# Patient Record
Sex: FEMALE | Race: OTHER | NOT HISPANIC OR LATINO
[De-identification: names, ages, dates, MRNs, and addresses within clinical notes are randomized per-mention and may not be internally consistent; named-entity substitution may affect disease eponyms.]

---

## 2017-12-28 ENCOUNTER — APPOINTMENT (OUTPATIENT)
Dept: CARDIOLOGY | Facility: CLINIC | Age: 62
End: 2017-12-28
Payer: COMMERCIAL

## 2017-12-28 PROCEDURE — 93351 STRESS TTE COMPLETE: CPT

## 2018-01-04 ENCOUNTER — APPOINTMENT (OUTPATIENT)
Dept: CARDIOLOGY | Facility: CLINIC | Age: 63
End: 2018-01-04

## 2018-01-11 ENCOUNTER — APPOINTMENT (OUTPATIENT)
Dept: CARDIOLOGY | Facility: CLINIC | Age: 63
End: 2018-01-11
Payer: COMMERCIAL

## 2018-01-11 PROCEDURE — 99214 OFFICE O/P EST MOD 30 MIN: CPT

## 2018-01-25 ENCOUNTER — RECORD ABSTRACTING (OUTPATIENT)
Age: 63
End: 2018-01-25

## 2018-01-25 DIAGNOSIS — Z78.9 OTHER SPECIFIED HEALTH STATUS: ICD-10-CM

## 2018-02-10 ENCOUNTER — APPOINTMENT (OUTPATIENT)
Dept: MAMMOGRAPHY | Facility: CLINIC | Age: 63
End: 2018-02-10
Payer: COMMERCIAL

## 2018-02-10 ENCOUNTER — OUTPATIENT (OUTPATIENT)
Dept: OUTPATIENT SERVICES | Facility: HOSPITAL | Age: 63
LOS: 1 days | End: 2018-02-10
Payer: COMMERCIAL

## 2018-02-10 DIAGNOSIS — Z98.89 OTHER SPECIFIED POSTPROCEDURAL STATES: Chronic | ICD-10-CM

## 2018-02-10 DIAGNOSIS — Z00.00 ENCOUNTER FOR GENERAL ADULT MEDICAL EXAMINATION WITHOUT ABNORMAL FINDINGS: ICD-10-CM

## 2018-02-10 PROCEDURE — 77063 BREAST TOMOSYNTHESIS BI: CPT | Mod: 26

## 2018-02-10 PROCEDURE — 77067 SCR MAMMO BI INCL CAD: CPT

## 2018-02-10 PROCEDURE — 77067 SCR MAMMO BI INCL CAD: CPT | Mod: 26

## 2018-02-10 PROCEDURE — 77063 BREAST TOMOSYNTHESIS BI: CPT

## 2018-02-28 ENCOUNTER — RX RENEWAL (OUTPATIENT)
Age: 63
End: 2018-02-28

## 2018-03-01 ENCOUNTER — TRANSCRIPTION ENCOUNTER (OUTPATIENT)
Age: 63
End: 2018-03-01

## 2018-03-01 ENCOUNTER — APPOINTMENT (OUTPATIENT)
Dept: CARDIOLOGY | Facility: CLINIC | Age: 63
End: 2018-03-01
Payer: COMMERCIAL

## 2018-03-01 VITALS
HEIGHT: 60 IN | DIASTOLIC BLOOD PRESSURE: 80 MMHG | HEART RATE: 67 BPM | WEIGHT: 149 LBS | RESPIRATION RATE: 14 BRPM | SYSTOLIC BLOOD PRESSURE: 148 MMHG | BODY MASS INDEX: 29.25 KG/M2

## 2018-03-01 PROCEDURE — 93000 ELECTROCARDIOGRAM COMPLETE: CPT

## 2018-03-01 PROCEDURE — 99214 OFFICE O/P EST MOD 30 MIN: CPT

## 2018-05-16 ENCOUNTER — RECORD ABSTRACTING (OUTPATIENT)
Age: 63
End: 2018-05-16

## 2018-05-17 ENCOUNTER — APPOINTMENT (OUTPATIENT)
Dept: CARDIOLOGY | Facility: CLINIC | Age: 63
End: 2018-05-17
Payer: COMMERCIAL

## 2018-05-17 VITALS
BODY MASS INDEX: 29.45 KG/M2 | DIASTOLIC BLOOD PRESSURE: 74 MMHG | SYSTOLIC BLOOD PRESSURE: 126 MMHG | WEIGHT: 150 LBS | HEIGHT: 60 IN | HEART RATE: 69 BPM | OXYGEN SATURATION: 99 %

## 2018-05-17 DIAGNOSIS — M10.9 GOUT, UNSPECIFIED: ICD-10-CM

## 2018-05-17 PROCEDURE — 93000 ELECTROCARDIOGRAM COMPLETE: CPT

## 2018-05-17 PROCEDURE — 99214 OFFICE O/P EST MOD 30 MIN: CPT

## 2018-05-17 RX ORDER — FEBUXOSTAT 40 MG/1
40 TABLET ORAL
Qty: 30 | Refills: 0 | Status: DISCONTINUED | COMMUNITY
Start: 2018-03-26 | End: 2018-05-17

## 2018-06-04 ENCOUNTER — RX RENEWAL (OUTPATIENT)
Age: 63
End: 2018-06-04

## 2018-08-20 ENCOUNTER — APPOINTMENT (OUTPATIENT)
Dept: CARDIOLOGY | Facility: CLINIC | Age: 63
End: 2018-08-20
Payer: COMMERCIAL

## 2018-11-01 ENCOUNTER — APPOINTMENT (OUTPATIENT)
Dept: CARDIOLOGY | Facility: CLINIC | Age: 63
End: 2018-11-01
Payer: COMMERCIAL

## 2018-11-01 PROCEDURE — A9500: CPT

## 2018-11-01 PROCEDURE — 93015 CV STRESS TEST SUPVJ I&R: CPT

## 2018-11-01 PROCEDURE — 78452 HT MUSCLE IMAGE SPECT MULT: CPT

## 2018-11-01 RX ORDER — AMINOPHYLLINE 25 MG/ML
25 INJECTION, SOLUTION INTRAVENOUS
Qty: 0 | Refills: 0 | Status: COMPLETED | OUTPATIENT
Start: 2018-11-01

## 2018-11-01 RX ORDER — REGADENOSON 0.08 MG/ML
0.4 INJECTION, SOLUTION INTRAVENOUS
Qty: 1 | Refills: 0 | Status: COMPLETED | OUTPATIENT
Start: 2018-11-01

## 2018-11-01 RX ADMIN — REGADENOSON 0 MG/5ML: 0.08 INJECTION, SOLUTION INTRAVENOUS at 00:00

## 2018-11-01 RX ADMIN — AMINOPHYLLINE 0 MG/ML: 25 INJECTION, SOLUTION INTRAVENOUS at 00:00

## 2018-11-08 RX ORDER — KIT FOR THE PREPARATION OF TECHNETIUM TC99M SESTAMIBI 1 MG/5ML
INJECTION, POWDER, LYOPHILIZED, FOR SOLUTION PARENTERAL
Refills: 0 | Status: COMPLETED | OUTPATIENT
Start: 2018-11-08

## 2018-11-08 RX ADMIN — KIT FOR THE PREPARATION OF TECHNETIUM TC99M SESTAMIBI 0: 1 INJECTION, POWDER, LYOPHILIZED, FOR SOLUTION PARENTERAL at 00:00

## 2018-11-26 ENCOUNTER — APPOINTMENT (OUTPATIENT)
Dept: CARDIOLOGY | Facility: CLINIC | Age: 63
End: 2018-11-26
Payer: COMMERCIAL

## 2018-11-26 VITALS
BODY MASS INDEX: 29.45 KG/M2 | RESPIRATION RATE: 14 BRPM | OXYGEN SATURATION: 98 % | WEIGHT: 150 LBS | DIASTOLIC BLOOD PRESSURE: 72 MMHG | HEIGHT: 60 IN | SYSTOLIC BLOOD PRESSURE: 116 MMHG | HEART RATE: 94 BPM

## 2018-11-26 PROCEDURE — 93000 ELECTROCARDIOGRAM COMPLETE: CPT

## 2018-11-26 PROCEDURE — 99213 OFFICE O/P EST LOW 20 MIN: CPT

## 2018-11-26 RX ORDER — FEBUXOSTAT 80 MG/1
80 TABLET ORAL
Qty: 90 | Refills: 0 | Status: DISCONTINUED | COMMUNITY
Start: 2018-04-29 | End: 2018-11-26

## 2018-11-26 RX ORDER — ALLOPURINOL 200 MG/1
TABLET ORAL
Refills: 0 | Status: DISCONTINUED | COMMUNITY
End: 2018-11-26

## 2018-11-26 NOTE — DISCUSSION/SUMMARY
[FreeTextEntry1] : 1. No additional cardiac testing at this time.\par 2. Continue current cardiac meds in doses as noted above for hypertension.\par 3. Monitor BP at home, keep a log and bring to f/u.\par 4. Consider rheumatology evaluation for her gout.\par 5. Continue physical therapy for her neck.\par 6. Will obtain most recent bloodwork.\par 7. Follow up with primary doctor for treatment of diabetes.\par 8. Follow up here in six months.

## 2018-11-26 NOTE — ASSESSMENT
[FreeTextEntry1] : EKG: Sinus rhythm with nonspecific T-wave changes.\par \par Her stress test performed November 1, 2018 is a pharmacologic study which was tolerated well. Blood pressure response was normal. EKG showed horizontal ST depressions with infusion. However at one point and the nuclear images which demonstrate no evidence of ischemia or infarct with a normal ejection fraction of 74%. Incidentally noted was borderline TID of no clear significance given the normal perfusion.\par \par 63-year-old female with a past medical history of hypertension who presents today for followup. Prior stress echo was unremarkable so she underwent a nuclear stress test which reveals no significant evidence of ischemia or infarct I believe her EKGs are simply false positive findings. She remains asymptomatic from a cardiac standpoint. Blood pressure appears to be well-controlled. I do not believe the finding of TID is significant at this time.

## 2018-11-26 NOTE — REVIEW OF SYSTEMS
[FreeTextEntry1] : Patient denies chest pain, other than noted above full review of systems is unremarkable.

## 2018-11-26 NOTE — HISTORY OF PRESENT ILLNESS
[FreeTextEntry1] : Patient comes back to the office today still having issues with gout in her hands and also pain in her neck. The pain in her neck is still improving with physical therapy but nothing seems to be helping with the gout. Her blood pressures have been in the 120s systolic as reported by the patient at home. She reports no other symptoms at this time and has had no chest pain. Patient denies shortness of breath, palpitations, orthopnea, presyncope, syncope.

## 2018-11-26 NOTE — PHYSICAL EXAM
[General Appearance - In No Acute Distress] : no acute distress [Normal Conjunctiva] : the conjunctiva exhibited no abnormalities [Normal Oral Mucosa] : normal oral mucosa [Normal Oropharynx] : normal oropharynx [Auscultation Breath Sounds / Voice Sounds] : lungs were clear to auscultation bilaterally [Abdomen Soft] : soft [Abdomen Tenderness] : non-tender [Abnormal Walk] : normal gait [Nail Clubbing] : no clubbing of the fingernails [Cyanosis, Localized] : no localized cyanosis [Skin Color & Pigmentation] : normal skin color and pigmentation [Oriented To Time, Place, And Person] : oriented to person, place, and time [Affect] : the affect was normal [FreeTextEntry1] : Cardiac: Normal S1 and split S2, no S3, no S4. No audible murmurs or rubs. Regular rate and rhythm.

## 2018-12-28 ENCOUNTER — APPOINTMENT (OUTPATIENT)
Dept: CARDIOLOGY | Facility: CLINIC | Age: 63
End: 2018-12-28

## 2019-02-16 ENCOUNTER — APPOINTMENT (OUTPATIENT)
Dept: MAMMOGRAPHY | Facility: CLINIC | Age: 64
End: 2019-02-16
Payer: COMMERCIAL

## 2019-02-16 ENCOUNTER — OUTPATIENT (OUTPATIENT)
Dept: OUTPATIENT SERVICES | Facility: HOSPITAL | Age: 64
LOS: 1 days | End: 2019-02-16
Payer: COMMERCIAL

## 2019-02-16 DIAGNOSIS — Z12.31 ENCOUNTER FOR SCREENING MAMMOGRAM FOR MALIGNANT NEOPLASM OF BREAST: ICD-10-CM

## 2019-02-16 DIAGNOSIS — Z98.89 OTHER SPECIFIED POSTPROCEDURAL STATES: Chronic | ICD-10-CM

## 2019-02-16 PROCEDURE — 77063 BREAST TOMOSYNTHESIS BI: CPT | Mod: 26

## 2019-02-16 PROCEDURE — 77063 BREAST TOMOSYNTHESIS BI: CPT

## 2019-02-16 PROCEDURE — 77067 SCR MAMMO BI INCL CAD: CPT

## 2019-02-16 PROCEDURE — 77067 SCR MAMMO BI INCL CAD: CPT | Mod: 26

## 2019-06-05 ENCOUNTER — MEDICATION RENEWAL (OUTPATIENT)
Age: 64
End: 2019-06-05

## 2019-06-06 ENCOUNTER — APPOINTMENT (OUTPATIENT)
Dept: CARDIOLOGY | Facility: CLINIC | Age: 64
End: 2019-06-06
Payer: COMMERCIAL

## 2019-06-06 ENCOUNTER — NON-APPOINTMENT (OUTPATIENT)
Age: 64
End: 2019-06-06

## 2019-06-06 VITALS
HEART RATE: 76 BPM | HEIGHT: 60 IN | DIASTOLIC BLOOD PRESSURE: 73 MMHG | WEIGHT: 149 LBS | RESPIRATION RATE: 14 BRPM | SYSTOLIC BLOOD PRESSURE: 122 MMHG | OXYGEN SATURATION: 99 % | BODY MASS INDEX: 29.25 KG/M2

## 2019-06-06 DIAGNOSIS — R73.03 PREDIABETES.: ICD-10-CM

## 2019-06-06 DIAGNOSIS — R94.31 ABNORMAL ELECTROCARDIOGRAM [ECG] [EKG]: ICD-10-CM

## 2019-06-06 PROCEDURE — 99214 OFFICE O/P EST MOD 30 MIN: CPT

## 2019-06-06 PROCEDURE — 93000 ELECTROCARDIOGRAM COMPLETE: CPT

## 2019-06-06 NOTE — PHYSICAL EXAM
[General Appearance - In No Acute Distress] : no acute distress [Normal Conjunctiva] : the conjunctiva exhibited no abnormalities [Normal Oral Mucosa] : normal oral mucosa [Normal Oropharynx] : normal oropharynx [Auscultation Breath Sounds / Voice Sounds] : lungs were clear to auscultation bilaterally [Abdomen Soft] : soft [Nail Clubbing] : no clubbing of the fingernails [Abnormal Walk] : normal gait [Abdomen Tenderness] : non-tender [Skin Color & Pigmentation] : normal skin color and pigmentation [Oriented To Time, Place, And Person] : oriented to person, place, and time [Cyanosis, Localized] : no localized cyanosis [Affect] : the affect was normal [FreeTextEntry1] : Cardiac: Normal S1 and split S2, no S3, no S4. No audible murmurs or rubs. Regular rate and rhythm.

## 2019-06-06 NOTE — DISCUSSION/SUMMARY
[FreeTextEntry1] : 1. No additional cardiac testing at this time.\par 2. Continue current cardiac meds in doses as noted above for hypertension.\par 3. Monitor BP at home, keep a log and bring to f/u.\par 4. Follow up with her primary with regards to gallop, issues with her neck and sciatica.\par 5. Echocardiogram prior to followup to reevaluate prior known LVH.\par 6. Follow up with primary doctor for treatment of diabetes.\par 7. Patient encouraged to work on diet to lose weight. Patient encouraged to work on a healthy diet high in lean protein, whole grains and vegetables, and lower in white flour and simple sugars.\par 8. Patient is encouraged to exercise at least 30 minutes a day everyday of the week.\par 9. Follow up here in six months.

## 2019-06-06 NOTE — HISTORY OF PRESENT ILLNESS
[FreeTextEntry1] : Patient returns to the office temperature is still having issues with pains in her hands and her neck related to gout and cervical disc disease. She is also having issues with sciatica as well. Unfortunately she never discussed this with her primary doctor. She was noted to be hypokalemic on her blood work but has yet to  the potassium supplement. Blood pressures have been in the 110s to 120s over 60s to 70s. She reports no other symptoms. She has not really been exercising but reports no exertional complaints. Patient denies chest pain, shortness of breath, palpitations, orthopnea, presyncope, syncope.

## 2019-06-06 NOTE — ASSESSMENT
[FreeTextEntry1] : EKG: Sinus rhythm with nonspecific T-wave changes.\par \par Nuclear stress test performed November 1, 2018 is a pharmacologic study which was tolerated well. Blood pressure response was normal. EKG showed horizontal ST depressions with infusion. However at one point and the nuclear images which demonstrate no evidence of ischemia or infarct with a normal ejection fraction of 74%. Incidentally noted was borderline TID of no clear significance given the normal perfusion.\par \par 64-year-old female with a past medical history of hypertension who presents today for followup. Patient remains asymptomatic from a cardiac standpoint. Blood pressure remains well controlled. Diabetes control had worsened but seems to be improving now. She is a bit hypokalemic, possibly in part from being on HCTZ and should be supplemented with potassium. She continues primarily complaining of issues related to gout and arthritis and should followup with her primary in this regard.

## 2019-11-11 ENCOUNTER — APPOINTMENT (OUTPATIENT)
Dept: CARDIOLOGY | Facility: CLINIC | Age: 64
End: 2019-11-11
Payer: COMMERCIAL

## 2019-11-11 PROCEDURE — 93306 TTE W/DOPPLER COMPLETE: CPT

## 2019-12-09 ENCOUNTER — APPOINTMENT (OUTPATIENT)
Dept: CARDIOLOGY | Facility: CLINIC | Age: 64
End: 2019-12-09
Payer: COMMERCIAL

## 2019-12-09 ENCOUNTER — NON-APPOINTMENT (OUTPATIENT)
Age: 64
End: 2019-12-09

## 2019-12-09 VITALS
BODY MASS INDEX: 28.86 KG/M2 | HEART RATE: 82 BPM | SYSTOLIC BLOOD PRESSURE: 131 MMHG | HEIGHT: 60 IN | DIASTOLIC BLOOD PRESSURE: 79 MMHG | RESPIRATION RATE: 16 BRPM | WEIGHT: 147 LBS

## 2019-12-09 PROCEDURE — 99214 OFFICE O/P EST MOD 30 MIN: CPT

## 2019-12-09 PROCEDURE — 93000 ELECTROCARDIOGRAM COMPLETE: CPT

## 2019-12-09 NOTE — DISCUSSION/SUMMARY
[FreeTextEntry1] : 1. No additional cardiac testing at this time.\par 2. Increase nifedipine to 60 mg daily for hypertension. Continue with Olmesartan HCT at current dose.\par 3. Monitor BP at home, keep a log and bring to f/u.\par 4. Follow up with her primary with regards to issues with her neck and sciatica. She is considering going to pain management.\par 5. Follow up with primary doctor for treatment of diabetes.\par 6. Patient encouraged to work on diet to lose weight. Patient encouraged to work on a healthy diet high in lean protein, whole grains and vegetables, and lower in white flour and simple sugars.\par 7. Patient is encouraged to exercise at least 30 minutes a day everyday of the week.\par 8. Follow up here in 4 months.

## 2019-12-09 NOTE — PHYSICAL EXAM
[General Appearance - In No Acute Distress] : no acute distress [Normal Conjunctiva] : the conjunctiva exhibited no abnormalities [Normal Oral Mucosa] : normal oral mucosa [Normal Oropharynx] : normal oropharynx [Auscultation Breath Sounds / Voice Sounds] : lungs were clear to auscultation bilaterally [Abdomen Soft] : soft [Abdomen Tenderness] : non-tender [Abnormal Walk] : normal gait [Cyanosis, Localized] : no localized cyanosis [Skin Color & Pigmentation] : normal skin color and pigmentation [Nail Clubbing] : no clubbing of the fingernails [Oriented To Time, Place, And Person] : oriented to person, place, and time [Affect] : the affect was normal [FreeTextEntry1] : Cardiac: Normal S1 and split S2, no S3, no S4. No audible murmurs or rubs. Regular rate and rhythm.

## 2019-12-09 NOTE — ASSESSMENT
[FreeTextEntry1] : EKG: Sinus rhythm with nonspecific T-wave changes.\par \par Nuclear stress test performed November 1, 2018 is a pharmacologic study which was tolerated well. Blood pressure response was normal. EKG showed horizontal ST depressions with infusion. However at one point and the nuclear images which demonstrate no evidence of ischemia or infarct with a normal ejection fraction of 74%. Incidentally noted was borderline TID of no clear significance given the normal perfusion.\par \par Echocardiogram November 11, 2019 demonstrated left ventricle normal size and function with ejection fraction of 65%. Mild concentric LVH noted. Trace mitral and moderate tricuspid regurgitation noted. Borderline elevated pulmonary pressures noted.\par \par 64-year-old female with a past medical history of hypertension with LVH who presents today for followup. Patient remains asymptomatic from a cardiac standpoint. Blood pressure control appears to be suboptimal especially given the findings of the echocardiogram. I recommend intensifying her therapy at this time. Additionally further options with regards to pain control may help with her blood pressure as well. Her mildly elevated pulmonary pressures are likely secondary to diastolic dysfunction from hypertension and LVH as well. There is no evidence of cardiac ischemia or CHF at this time.

## 2019-12-09 NOTE — HISTORY OF PRESENT ILLNESS
[FreeTextEntry1] : Patient returns to the office today unfortunately still having a lot of issues with pain in her neck for which he is doing physical therapy but only improving slightly. She still has issues with sciatica at time as well but for now this seems to be okay. Blood pressures at home have been in the 130s to 140s over 70s but higher when she has pain. She does not report any other specific physical complaints. Her diabetes appears to be stable and she is following with her primary doctor. Her gout is also controlled. Patient denies chest pain, shortness of breath, palpitations, orthopnea, presyncope, syncope.

## 2020-04-06 ENCOUNTER — APPOINTMENT (OUTPATIENT)
Dept: CARDIOLOGY | Facility: CLINIC | Age: 65
End: 2020-04-06

## 2020-06-01 RX ORDER — OLMESARTAN MEDOXOMIL AND HYDROCHLOROTHIAZIDE 40; 25 MG/1; MG/1
40-25 TABLET ORAL DAILY
Qty: 30 | Refills: 3 | Status: DISCONTINUED | COMMUNITY
End: 2020-06-01

## 2020-06-02 ENCOUNTER — APPOINTMENT (OUTPATIENT)
Dept: CARDIOLOGY | Facility: CLINIC | Age: 65
End: 2020-06-02
Payer: COMMERCIAL

## 2020-06-02 DIAGNOSIS — R00.0 TACHYCARDIA, UNSPECIFIED: ICD-10-CM

## 2020-06-02 DIAGNOSIS — I07.1 RHEUMATIC TRICUSPID INSUFFICIENCY: ICD-10-CM

## 2020-06-02 PROCEDURE — 99213 OFFICE O/P EST LOW 20 MIN: CPT | Mod: 95

## 2020-06-02 NOTE — HISTORY OF PRESENT ILLNESS
[Medical Office: ([facility])___] : at the medical office located in  [Home] : at home, [unfilled] , at the time of the visit. [Verbal consent obtained from patient] : the patient, [unfilled] [FreeTextEntry1] : Patient presents today feeling generally well. She still has a lot of issues with pain in her back and neck. She has been monitoring her blood pressures at home and they have ranged mostly in the 120s to 130s over 60s to 70s. There are occasional episodes where it goes higher possibly on days when she is in more pain. Of note her heart rates tend to be in the 90s to 100s. She does not report any palpitations or any other symptoms. Patient denies chest pain, shortness of breath, orthopnea, presyncope, syncope.

## 2020-06-02 NOTE — DISCUSSION/SUMMARY
[FreeTextEntry1] : 1. Check 3 days Holter monitor to evaluate tachycardia.\par 2. Continue current cardiac meds in doses as noted above for hypertension\par 3. Monitor BP at home, keep a log and bring to f/u.\par 4. Follow up with her primary with regards to issues with her neck and sciatica. \par 5. Follow up with primary doctor for treatment of diabetes. She will have repeat blood work.\par 6. Patient encouraged to work on diet to lose weight. Patient encouraged to work on a healthy diet high in lean protein, whole grains and vegetables, and lower in white flour and simple sugars.\par 7. Patient is encouraged to exercise at least 30 minutes a day everyday of the week.\par 8. Follow up here in 3 months.

## 2020-06-02 NOTE — ASSESSMENT
[FreeTextEntry1] : Nuclear stress test performed November 1, 2018 is a pharmacologic study which was tolerated well. Blood pressure response was normal. EKG showed horizontal ST depressions with infusion. However at one point and the nuclear images which demonstrate no evidence of ischemia or infarct with a normal ejection fraction of 74%. Incidentally noted was borderline TID of no clear significance given the normal perfusion.\par \par Echocardiogram November 11, 2019 demonstrated left ventricle normal size and function with ejection fraction of 65%. Mild concentric LVH noted. Trace mitral and moderate tricuspid regurgitation noted. Borderline elevated pulmonary pressures noted.\par \par 65-year-old female with a past medical history of hypertension with LVH who presents today for followup. Patient remains asymptomatic from a cardiac standpoint.  Blood pressure control appears to be improved at this time. It is still somewhat elevated at times, likely on days when she is in more pain, but I do not see a need for further adjustments to her medication at this time. Her heart rates tend to be elevated which also may be related to her pain, but I would like to have her do a Holter monitor to evaluate average heart rate. She is a symptomatically this as well. She is tolerating her medications. She is due for repeat blood work which she is going to have tomorrow with her primary.

## 2020-07-28 ENCOUNTER — APPOINTMENT (OUTPATIENT)
Dept: CARDIOLOGY | Facility: CLINIC | Age: 65
End: 2020-07-28
Payer: COMMERCIAL

## 2020-07-28 VITALS
TEMPERATURE: 98.2 F | WEIGHT: 142 LBS | RESPIRATION RATE: 16 BRPM | HEIGHT: 60 IN | DIASTOLIC BLOOD PRESSURE: 70 MMHG | SYSTOLIC BLOOD PRESSURE: 120 MMHG | OXYGEN SATURATION: 99 % | BODY MASS INDEX: 27.88 KG/M2 | HEART RATE: 98 BPM

## 2020-07-28 DIAGNOSIS — I73.9 PERIPHERAL VASCULAR DISEASE, UNSPECIFIED: ICD-10-CM

## 2020-07-28 PROCEDURE — 93000 ELECTROCARDIOGRAM COMPLETE: CPT | Mod: 59

## 2020-07-28 PROCEDURE — 99214 OFFICE O/P EST MOD 30 MIN: CPT

## 2020-07-28 RX ORDER — METFORMIN HYDROCHLORIDE 500 MG/1
500 TABLET, COATED ORAL TWICE DAILY
Refills: 0 | Status: ACTIVE | COMMUNITY

## 2020-07-28 NOTE — HISTORY OF PRESENT ILLNESS
[FreeTextEntry1] : Patient presents back to the office today complaining of some edema in her legs as well as discoloration and some discomfort. She is concerned that the edema may be related in part to the nifedipine. Blood pressures have been lower in the 90s to 120s over 60s to 70s. She reports no dizziness or lightheadedness. The pain in her legs occurs primarily when she walks but also at times when she touches her legs. The legs have become discolored with some darkening in certain areas and white spots in other areas. She does not report any other specific physical complaints. Patient denies chest pain, shortness of breath, palpitations, orthopnea, presyncope, syncope.

## 2020-07-28 NOTE — ASSESSMENT
[FreeTextEntry1] : EKG: Sinus rhythm with nonspecific T-wave changes.\par \par Nuclear stress test performed November 1, 2018 is a pharmacologic study which was tolerated well. Blood pressure response was normal. EKG showed horizontal ST depressions with infusion. However at one point and the nuclear images which demonstrate no evidence of ischemia or infarct with a normal ejection fraction of 74%. Incidentally noted was borderline TID of no clear significance given the normal perfusion.\par \par Echocardiogram November 11, 2019 demonstrated left ventricle normal size and function with ejection fraction of 65%. Mild concentric LVH noted. Trace mitral and moderate tricuspid regurgitation noted. Borderline elevated pulmonary pressures noted.\par \par 65-year-old female with a past medical history of hypertension with LVH who presents today for followup. Patient seems to be doing generally well from a cardiac standpoint but is having some issues with her legs. She is having very mild edema in her feet but is concerned this may be from nifedipine. Given that her blood pressures have improved significantly and she has lost some weight we can attempt to pull back on nifedipine and see if this helps with the edema. She is having some pain in her legs when she walks also some discoloration of the skin which is causing her some concern. I will evaluate for venous and arterial disease. If this is unremarkable I have advised her to consider follow up with dermatology and her primary.

## 2020-07-28 NOTE — DISCUSSION/SUMMARY
[FreeTextEntry1] : 1. Lower extremity venous Doppler to evaluate the skin discolorations and edema.\par 2. GREGORY to further evaluate for PAD given claudication-like symptoms.\par 3. Decrease nifedipine to 30 mg daily for hypertension. Continue with Olmesartan HCT at current dose.\par 4. Monitor BP at home, keep a log and bring to f/u.\par 5. Follow up with primary doctor for treatment of diabetes.\par 6. Patient encouraged to work on diet to lose weight. Patient encouraged to work on a healthy diet high in lean protein, whole grains and vegetables, and lower in white flour and simple sugars.\par 7. Patient is encouraged to exercise at least 30 minutes a day everyday of the week.\par 8. Decrease nifedipine to 30 mg daily for hypertension.\par 9. Follow up here in 3 months.

## 2020-09-09 ENCOUNTER — APPOINTMENT (OUTPATIENT)
Dept: CARDIOLOGY | Facility: CLINIC | Age: 65
End: 2020-09-09
Payer: COMMERCIAL

## 2020-09-09 PROCEDURE — 93923 UPR/LXTR ART STDY 3+ LVLS: CPT

## 2020-10-08 ENCOUNTER — APPOINTMENT (OUTPATIENT)
Dept: CARDIOLOGY | Facility: CLINIC | Age: 65
End: 2020-10-08
Payer: COMMERCIAL

## 2020-10-08 PROCEDURE — 93970 EXTREMITY STUDY: CPT

## 2020-10-19 ENCOUNTER — APPOINTMENT (OUTPATIENT)
Dept: CARDIOLOGY | Facility: CLINIC | Age: 65
End: 2020-10-19
Payer: COMMERCIAL

## 2020-10-19 ENCOUNTER — NON-APPOINTMENT (OUTPATIENT)
Age: 65
End: 2020-10-19

## 2020-10-19 VITALS
HEART RATE: 81 BPM | HEIGHT: 60 IN | TEMPERATURE: 97.7 F | RESPIRATION RATE: 16 BRPM | DIASTOLIC BLOOD PRESSURE: 78 MMHG | BODY MASS INDEX: 27.09 KG/M2 | SYSTOLIC BLOOD PRESSURE: 138 MMHG | OXYGEN SATURATION: 99 % | WEIGHT: 138 LBS

## 2020-10-19 PROCEDURE — 93000 ELECTROCARDIOGRAM COMPLETE: CPT

## 2020-10-19 PROCEDURE — 99072 ADDL SUPL MATRL&STAF TM PHE: CPT

## 2020-10-19 PROCEDURE — 99214 OFFICE O/P EST MOD 30 MIN: CPT

## 2020-10-19 NOTE — HISTORY OF PRESENT ILLNESS
[FreeTextEntry1] : The patient presents back today noting her legs are much improved since the decrease in nifedipine. She is having no significant edema at this time and the discoloration has improved as well. Blood pressures have remained in the 110s to 120s over 60s. She offers no other complaints at this time and otherwise feels well. She remains active without a problem. Patient denies chest pain, shortness of breath, palpitations, orthopnea, presyncope, syncope.

## 2020-10-19 NOTE — DISCUSSION/SUMMARY
[FreeTextEntry1] : 1. Continue current cardiac meds in doses as noted above for hypertension.\par 2. No additional cardiac testing at this time.\par 3. Monitor BP at home, keep a log and bring to f/u.\par 4. Follow up with primary doctor for treatment of diabetes.\par 5. Patient is encouraged to exercise at least 30 minutes a day everyday of the week.\par 6. Follow up here in 3 months.

## 2020-10-19 NOTE — ASSESSMENT
[FreeTextEntry1] : Nuclear stress test performed November 1, 2018 is a pharmacologic study which was tolerated well. Blood pressure response was normal. EKG showed horizontal ST depressions with infusion. However at one point and the nuclear images which demonstrate no evidence of ischemia or infarct with a normal ejection fraction of 74%. Incidentally noted was borderline TID of no clear significance given the normal perfusion.\par \par Echocardiogram November 11, 2019 demonstrated left ventricle normal size and function with ejection fraction of 65%. Mild concentric LVH noted. Trace mitral and moderate tricuspid regurgitation noted. Borderline elevated pulmonary pressures noted.\par \par EKG: Sinus rhythm with no significant ST or T wave changes.\par \par 65-year-old female with a past medical history of HTN, DM who presents today for followup. Patient seems to be doing generally well from a cardiac standpoint. Issues with edema and discoloration in her legs has improved since the decrease in nifedipine. Blood pressures have thankfully not gone up coupled with some weight loss. She asked about the possibility of further lower her medication I told her if she continues to lose weight that may be possible, such as discontinuing nifedipine altogether. She otherwise appears to be well and stable from a cardiac standpoint. Labwork shows excellent control of her lipids. GREGORY and lower extremity venous Doppler showed no evidence of PAD or venous thrombosis.

## 2020-10-19 NOTE — PHYSICAL EXAM
[General Appearance - In No Acute Distress] : no acute distress [Normal Conjunctiva] : the conjunctiva exhibited no abnormalities [Normal Oral Mucosa] : normal oral mucosa [Normal Oropharynx] : normal oropharynx [Auscultation Breath Sounds / Voice Sounds] : lungs were clear to auscultation bilaterally [Abdomen Soft] : soft [Abdomen Tenderness] : non-tender [Abnormal Walk] : normal gait [Nail Clubbing] : no clubbing of the fingernails [Cyanosis, Localized] : no localized cyanosis [Affect] : the affect was normal [Skin Color & Pigmentation] : normal skin color and pigmentation [Oriented To Time, Place, And Person] : oriented to person, place, and time [FreeTextEntry1] : Tr b/l pedal edema.  LE skin is darkened in spots.

## 2021-01-07 ENCOUNTER — APPOINTMENT (OUTPATIENT)
Dept: CARDIOLOGY | Facility: CLINIC | Age: 66
End: 2021-01-07
Payer: COMMERCIAL

## 2021-01-07 ENCOUNTER — NON-APPOINTMENT (OUTPATIENT)
Age: 66
End: 2021-01-07

## 2021-01-07 VITALS
WEIGHT: 141 LBS | HEART RATE: 76 BPM | SYSTOLIC BLOOD PRESSURE: 165 MMHG | TEMPERATURE: 97 F | HEIGHT: 60 IN | RESPIRATION RATE: 16 BRPM | BODY MASS INDEX: 27.68 KG/M2 | DIASTOLIC BLOOD PRESSURE: 82 MMHG

## 2021-01-07 PROCEDURE — 99072 ADDL SUPL MATRL&STAF TM PHE: CPT

## 2021-01-07 PROCEDURE — 99214 OFFICE O/P EST MOD 30 MIN: CPT

## 2021-01-07 PROCEDURE — 93000 ELECTROCARDIOGRAM COMPLETE: CPT

## 2021-01-07 NOTE — HISTORY OF PRESENT ILLNESS
[FreeTextEntry1] : Patient presents back to the office today feeling generally well.  Her edema has resolved and the discoloration in her feet has improved as well.  Blood pressures have remained mostly in the 120s to 130s, occasionally going into the 140s over 60s.  She does have some issues with shoulder pain recently and is wondering if that makes her blood pressure go higher at times.  She has also been using Advil on a daily basis.  No other symptoms at this time.  She is tolerating her medication well for now.  Patient denies chest pain, shortness of breath, palpitations, orthopnea, presyncope, syncope.

## 2021-01-07 NOTE — DISCUSSION/SUMMARY
[FreeTextEntry1] : 1. Continue current cardiac meds in doses as noted above for hypertension.\par 2. No additional cardiac testing at this time.\par 3. Monitor BP at home, keep a log and bring to f/u.\par 4. Follow up with primary doctor for treatment of diabetes.\par 5. Patient is encouraged to exercise at least 30 minutes a day everyday of the week.\par 6. Patient encouraged to work on diet to lose weight.\par 7. Follow up here in 4 months.

## 2021-01-07 NOTE — ASSESSMENT
[FreeTextEntry1] : Nuclear stress test performed November 1, 2018 is a pharmacologic study which was tolerated well. Blood pressure response was normal. EKG showed horizontal ST depressions with infusion. However at one point and the nuclear images which demonstrate no evidence of ischemia or infarct with a normal ejection fraction of 74%. Incidentally noted was borderline TID of no clear significance given the normal perfusion.\par \par Echocardiogram November 11, 2019 demonstrated left ventricle normal size and function with ejection fraction of 65%. Mild concentric LVH noted. Trace mitral and moderate tricuspid regurgitation noted. Borderline elevated pulmonary pressures noted.\par \par EKG: Sinus rhythm with nonspecific T wave changes.\par \par 65-year-old female with a past medical history of HTN, DM who presents today for followup. Patient seems to be doing generally well from a cardiac standpoint. Issues with edema and discoloration in her legs has resolved.  Blood pressure seems to have gone up slightly but I will make no additional changes for now.  EKG does show some mild T wave flattening but at this time given her lack of symptoms I do not see the need for further investigation.  There is no other evidence for ischemia at this time.  I have encouraged her to continue some efforts with weight loss which may also help with her mildly elevated A1c in addition to her blood pressure.

## 2021-01-07 NOTE — PHYSICAL EXAM
[General Appearance - In No Acute Distress] : no acute distress [Normal Conjunctiva] : the conjunctiva exhibited no abnormalities [Normal Oral Mucosa] : normal oral mucosa [Normal Oropharynx] : normal oropharynx [Auscultation Breath Sounds / Voice Sounds] : lungs were clear to auscultation bilaterally [Abdomen Soft] : soft [Abdomen Tenderness] : non-tender [Abnormal Walk] : normal gait [Nail Clubbing] : no clubbing of the fingernails [Cyanosis, Localized] : no localized cyanosis [Skin Color & Pigmentation] : normal skin color and pigmentation [Oriented To Time, Place, And Person] : oriented to person, place, and time [Affect] : the affect was normal [FreeTextEntry1] : Tr b/l pedal edema.  LE skin is darkened in spots.

## 2021-02-02 ENCOUNTER — NON-APPOINTMENT (OUTPATIENT)
Age: 66
End: 2021-02-02

## 2021-02-02 ENCOUNTER — APPOINTMENT (OUTPATIENT)
Dept: CARDIOLOGY | Facility: CLINIC | Age: 66
End: 2021-02-02
Payer: COMMERCIAL

## 2021-02-02 VITALS
HEART RATE: 93 BPM | RESPIRATION RATE: 16 BRPM | SYSTOLIC BLOOD PRESSURE: 125 MMHG | WEIGHT: 143 LBS | HEIGHT: 60 IN | TEMPERATURE: 97.1 F | DIASTOLIC BLOOD PRESSURE: 73 MMHG | BODY MASS INDEX: 28.07 KG/M2

## 2021-02-02 DIAGNOSIS — I51.7 CARDIOMEGALY: ICD-10-CM

## 2021-02-02 PROCEDURE — 93000 ELECTROCARDIOGRAM COMPLETE: CPT

## 2021-02-02 PROCEDURE — 99214 OFFICE O/P EST MOD 30 MIN: CPT

## 2021-02-02 PROCEDURE — 99072 ADDL SUPL MATRL&STAF TM PHE: CPT

## 2021-02-02 NOTE — HISTORY OF PRESENT ILLNESS
[FreeTextEntry1] : Patient presents back today concerned regarding recent elevations in her blood pressure.  Blood pressures had been running up to the 140s, but over the last couple of weeks they have been going into the 150s and even 160s over 60s to 70s and occasionally 80s.  She reports no change in her diet but does acknowledge that she is under stress and is always in some pain with regards to her back.  She has only use Tylenol for pain.  She certainly has a lot of anxiety regarding her blood pressure going up.  Her medications have not changed and she takes them faithfully.  Patient denies chest pain, shortness of breath, palpitations, orthopnea, presyncope, syncope.

## 2021-02-02 NOTE — ASSESSMENT
[FreeTextEntry1] : Nuclear stress test performed November 1, 2018 is a pharmacologic study which was tolerated well. Blood pressure response was normal. EKG showed horizontal ST depressions with infusion. However at one point and the nuclear images which demonstrate no evidence of ischemia or infarct with a normal ejection fraction of 74%. Incidentally noted was borderline TID of no clear significance given the normal perfusion.\par \par Echocardiogram November 11, 2019 demonstrated left ventricle normal size and function with ejection fraction of 65%. Mild concentric LVH noted. Trace mitral and moderate tricuspid regurgitation noted. Borderline elevated pulmonary pressures noted.\par \par EKG: Sinus rhythm with nonspecific ST and T wave changes.\par \par 65-year-old female with a past medical history of HTN, DM who presents today for followup.  Patient returns today because of recent elevations in her blood pressure.  I would like to get her machine checked to ensure it is accurate given the much better blood pressure in the office here today.  It is certainly possible that the recent elevation is simply because of her machine.  It is also possible it is related somewhat to her stress and pain.  We will have the machine checked and if it is accurate I will make changes to her medication.  She had problems with edema on higher dose nifedipine in the past so would consider changing her olmesartan HCT.  If the machine is inaccurate she will get a new machine and we will confirm its accuracy before she checks for a few weeks before considering changes to her medication.

## 2021-02-02 NOTE — DISCUSSION/SUMMARY
[FreeTextEntry1] : 1.  She will bring her machine in to have it checked.  If her machine is accurate I will change her olmesartan HCT.  If it does not she will get a new machine and after ensuring its accuracy check her blood pressure before considering changes to her medication.  \par 2. Continue current cardiac meds in doses as noted above for hypertension.\par 3. No additional cardiac testing at this time.\par 4. Follow up with primary doctor for treatment of diabetes.\par 5. Patient is encouraged to exercise at least 30 minutes a day everyday of the week.\par 6. Patient encouraged to work on diet to lose weight.\par 7. Follow up here in 2 months.

## 2021-02-04 ENCOUNTER — APPOINTMENT (OUTPATIENT)
Dept: CARDIOLOGY | Facility: CLINIC | Age: 66
End: 2021-02-04
Payer: COMMERCIAL

## 2021-02-04 PROCEDURE — ZZZZZ: CPT

## 2021-02-05 RX ORDER — OLMESARTAN MEDOXOMIL AND HYDROCHLOROTHIAZIDE 20; 12.5 MG/1; MG/1
20-12.5 TABLET ORAL DAILY
Refills: 0 | Status: DISCONTINUED | COMMUNITY
End: 2021-02-05

## 2021-03-20 ENCOUNTER — APPOINTMENT (OUTPATIENT)
Dept: MAMMOGRAPHY | Facility: CLINIC | Age: 66
End: 2021-03-20
Payer: COMMERCIAL

## 2021-03-20 ENCOUNTER — OUTPATIENT (OUTPATIENT)
Dept: OUTPATIENT SERVICES | Facility: HOSPITAL | Age: 66
LOS: 1 days | End: 2021-03-20
Payer: COMMERCIAL

## 2021-03-20 DIAGNOSIS — Z98.89 OTHER SPECIFIED POSTPROCEDURAL STATES: Chronic | ICD-10-CM

## 2021-03-20 DIAGNOSIS — Z12.31 ENCOUNTER FOR SCREENING MAMMOGRAM FOR MALIGNANT NEOPLASM OF BREAST: ICD-10-CM

## 2021-03-20 PROCEDURE — 77063 BREAST TOMOSYNTHESIS BI: CPT

## 2021-03-20 PROCEDURE — 77063 BREAST TOMOSYNTHESIS BI: CPT | Mod: 26

## 2021-03-20 PROCEDURE — 77067 SCR MAMMO BI INCL CAD: CPT | Mod: 26

## 2021-03-20 PROCEDURE — 77067 SCR MAMMO BI INCL CAD: CPT

## 2021-03-29 ENCOUNTER — APPOINTMENT (OUTPATIENT)
Dept: CARDIOLOGY | Facility: CLINIC | Age: 66
End: 2021-03-29
Payer: COMMERCIAL

## 2021-03-29 VITALS
HEART RATE: 97 BPM | WEIGHT: 136 LBS | TEMPERATURE: 97.6 F | SYSTOLIC BLOOD PRESSURE: 120 MMHG | OXYGEN SATURATION: 97 % | RESPIRATION RATE: 16 BRPM | BODY MASS INDEX: 26.7 KG/M2 | HEIGHT: 60 IN | DIASTOLIC BLOOD PRESSURE: 72 MMHG

## 2021-03-29 PROCEDURE — 99072 ADDL SUPL MATRL&STAF TM PHE: CPT

## 2021-03-29 PROCEDURE — 99214 OFFICE O/P EST MOD 30 MIN: CPT

## 2021-03-29 RX ORDER — NIFEDIPINE 30 MG/1
30 TABLET, FILM COATED, EXTENDED RELEASE ORAL
Qty: 90 | Refills: 1 | Status: DISCONTINUED | COMMUNITY
Start: 1900-01-01 | End: 2021-03-29

## 2021-03-29 NOTE — ASSESSMENT
[FreeTextEntry1] : Nuclear stress test performed November 1, 2018 is a pharmacologic study which was tolerated well. Blood pressure response was normal. EKG showed horizontal ST depressions with infusion. However at one point and the nuclear images which demonstrate no evidence of ischemia or infarct with a normal ejection fraction of 74%. Incidentally noted was borderline TID of no clear significance given the normal perfusion.\par \par Echocardiogram November 11, 2019 demonstrated left ventricle normal size and function with ejection fraction of 65%. Mild concentric LVH noted. Trace mitral and moderate tricuspid regurgitation noted. Borderline elevated pulmonary pressures noted.\par \par \par 65-year-old female with a past medical history of HTN, DM who presents today for followup.  Patient is still having some issues with what appears to be very mild edema and some discoloration in her legs.  At this time I will discontinue nifedipine as her blood pressure appears to be well controlled on telmisartan HCT as well.  She will start monitoring her blood pressure again after getting a new machine and if her blood pressures go up I will consider a non-calcium channel blocker medication given the issues with edema.  She has lost some weight and I encouraged her to continue to work on this as it may help avoid the need for additional medication as well.

## 2021-03-29 NOTE — HISTORY OF PRESENT ILLNESS
[FreeTextEntry1] : Patient presents back today still having issues with some edema in her legs.  She says primarily they feel heavy in the morning and appear to have some pitting.  Additionally she still has some discoloration in the foot.  She notices this change despite the change in her medication.  She has not been checking her blood pressure because her machine was reading high and she has not yet gotten a new one.  She reports no other symptoms.  Patient denies chest pain, shortness of breath, palpitations, orthopnea, presyncope, syncope.

## 2021-03-29 NOTE — DISCUSSION/SUMMARY
[FreeTextEntry1] : 1. Discontinue nifedipine\par 2. Continue other current cardiac meds in doses as noted above for hypertension.\par 3. No additional cardiac testing at this time.\par 4. Follow up with primary doctor for treatment of diabetes.\par 5. Patient is encouraged to exercise at least 30 minutes a day everyday of the week.\par 6. Patient encouraged to work on diet to lose weight.\par 7. Follow up here in 6 weeks.

## 2021-04-28 ENCOUNTER — NON-APPOINTMENT (OUTPATIENT)
Age: 66
End: 2021-04-28

## 2021-04-29 ENCOUNTER — APPOINTMENT (OUTPATIENT)
Dept: CARDIOLOGY | Facility: CLINIC | Age: 66
End: 2021-04-29
Payer: COMMERCIAL

## 2021-04-29 ENCOUNTER — NON-APPOINTMENT (OUTPATIENT)
Age: 66
End: 2021-04-29

## 2021-04-29 VITALS
WEIGHT: 138 LBS | BODY MASS INDEX: 27.09 KG/M2 | OXYGEN SATURATION: 98 % | RESPIRATION RATE: 16 BRPM | SYSTOLIC BLOOD PRESSURE: 126 MMHG | DIASTOLIC BLOOD PRESSURE: 73 MMHG | HEART RATE: 98 BPM | HEIGHT: 60 IN | TEMPERATURE: 97.2 F

## 2021-04-29 DIAGNOSIS — E11.9 TYPE 2 DIABETES MELLITUS W/OUT COMPLICATIONS: ICD-10-CM

## 2021-04-29 PROCEDURE — 99072 ADDL SUPL MATRL&STAF TM PHE: CPT

## 2021-04-29 PROCEDURE — 99214 OFFICE O/P EST MOD 30 MIN: CPT

## 2021-04-29 PROCEDURE — 93000 ELECTROCARDIOGRAM COMPLETE: CPT

## 2021-04-29 NOTE — ASSESSMENT
[FreeTextEntry1] : Nuclear stress test performed November 1, 2018 is a pharmacologic study which was tolerated well. Blood pressure response was normal. EKG showed horizontal ST depressions with infusion. However at one point and the nuclear images which demonstrate no evidence of ischemia or infarct with a normal ejection fraction of 74%. Incidentally noted was borderline TID of no clear significance given the normal perfusion.\par \par Echocardiogram November 11, 2019 demonstrated left ventricle normal size and function with ejection fraction of 65%. Mild concentric LVH noted. Trace mitral and moderate tricuspid regurgitation noted. Borderline elevated pulmonary pressures noted.\par \par \par \par 65-year-old female with a past medical history of HTN, DM who presents today for followup.  Patient presents today having had some dizziness yesterday associated with neck pain and also elevated blood pressures.  I believe the dizziness most likely is related to the neck and she has been having a lot of pain in her neck recently.  This may also be somewhat related to her elevated blood pressures which are probably also elevated because of the discontinuation of nifedipine.  Her edema did improve and I will try Bystolic as another option to try and get her blood pressure down.  Blood pressure does seem to be better here in the office and her machine may in fact be reading a bit high.

## 2021-04-29 NOTE — HISTORY OF PRESENT ILLNESS
[FreeTextEntry1] : Patient presents today because she is concerned regarding recent elevations in her blood pressure and yesterday she felt dizzy.  Patient symptoms started yesterday with pain in her neck and then she started having some dizziness.  She took some Tylenol.  Blood pressure was 156/72.  Recently blood pressure has been more elevated in the 150s to 170s systolic.  She does note the edema and discoloration that she had while taking nifedipine did go away after stopping the medication.  Patient denies chest pain, shortness of breath, palpitations, orthopnea, presyncope, syncope.

## 2021-04-29 NOTE — DISCUSSION/SUMMARY
[FreeTextEntry1] : 1. Start Bystolic 5mg QD given elevated BPs.\par 2. Continue other current cardiac meds in doses as noted above for hypertension.\par 3. Monitor BP at home, keep a log and bring to f/u.  She will change the batteries in her machine and use it plugs and is much as possible.  We will reevaluate again at follow-up.\par 4. No additional cardiac testing at this time.\par 5. Follow up with primary doctor for treatment of diabetes.\par 6. Patient is encouraged to exercise at least 30 minutes a day everyday of the week.\par 7. Patient encouraged to work on diet to lose weight.\par 8. Follow up here in 1 month.

## 2021-05-17 ENCOUNTER — APPOINTMENT (OUTPATIENT)
Dept: CARDIOLOGY | Facility: CLINIC | Age: 66
End: 2021-05-17

## 2021-05-24 ENCOUNTER — APPOINTMENT (OUTPATIENT)
Dept: CARDIOLOGY | Facility: CLINIC | Age: 66
End: 2021-05-24
Payer: COMMERCIAL

## 2021-05-24 VITALS
TEMPERATURE: 97.6 F | DIASTOLIC BLOOD PRESSURE: 78 MMHG | BODY MASS INDEX: 27.48 KG/M2 | WEIGHT: 140 LBS | SYSTOLIC BLOOD PRESSURE: 194 MMHG | HEIGHT: 60 IN | RESPIRATION RATE: 16 BRPM | HEART RATE: 68 BPM

## 2021-05-24 VITALS — SYSTOLIC BLOOD PRESSURE: 183 MMHG | DIASTOLIC BLOOD PRESSURE: 94 MMHG

## 2021-05-24 DIAGNOSIS — R51.9 HEADACHE, UNSPECIFIED: ICD-10-CM

## 2021-05-24 PROCEDURE — 99215 OFFICE O/P EST HI 40 MIN: CPT

## 2021-05-24 PROCEDURE — 93000 ELECTROCARDIOGRAM COMPLETE: CPT

## 2021-05-24 PROCEDURE — 99072 ADDL SUPL MATRL&STAF TM PHE: CPT

## 2021-05-24 NOTE — HISTORY OF PRESENT ILLNESS
[FreeTextEntry1] : Patient presents back today having initially stopped the telmisartan HCT when she started Bystolic.  With that her blood pressures did not improve.  For the last week she has been taking both but blood pressures have remained the 160s to 170s over 60s to 90s.  She did take nifedipine on 1 day and noted a drop in her blood pressure when she did.  Her edema has remained well controlled.  She recently has been dealing with headaches at times and some numbness in her left lower face.  No other new symptoms.  Patient denies chest pain, shortness of breath, palpitations, orthopnea, presyncope, syncope.

## 2021-05-24 NOTE — ASSESSMENT
[FreeTextEntry1] : Nuclear stress test performed November 1, 2018 is a pharmacologic study which was tolerated well. Blood pressure response was normal. EKG showed horizontal ST depressions with infusion. However at one point and the nuclear images which demonstrate no evidence of ischemia or infarct with a normal ejection fraction of 74%. Incidentally noted was borderline TID of no clear significance given the normal perfusion.\par \par Echocardiogram November 11, 2019 demonstrated left ventricle normal size and function with ejection fraction of 65%. Mild concentric LVH noted. Trace mitral and moderate tricuspid regurgitation noted. Borderline elevated pulmonary pressures noted.\par \par EKG: Sinus rhythm with borderline T wave changes.\par \par 65-year-old female with a past medical history of HTN, DM who presents today for followup.  Patient continues to have issues with very elevated blood pressure.  Initially she stopped her telmisartan HCT and just started taking Bystolic.  Since being on both her blood pressure is maybe a little lower than it was on just the Bystolic but still significantly elevated.  She did take nifedipine 1 day and then dropped her blood pressure.  It would appear that may be the best medication to treat her blood pressure but she did have issues with edema.  For now I will increase her current medication and see if this helps.  If her blood pressure remains elevated may have to reconsider going back on nifedipine.  I will also initiate a secondary work-up of hypertension given the accelerated nature of the hypertension.

## 2021-05-27 ENCOUNTER — APPOINTMENT (OUTPATIENT)
Dept: ULTRASOUND IMAGING | Facility: CLINIC | Age: 66
End: 2021-05-27
Payer: COMMERCIAL

## 2021-05-27 ENCOUNTER — OUTPATIENT (OUTPATIENT)
Dept: OUTPATIENT SERVICES | Facility: HOSPITAL | Age: 66
LOS: 1 days | End: 2021-05-27
Payer: COMMERCIAL

## 2021-05-27 ENCOUNTER — APPOINTMENT (OUTPATIENT)
Dept: CT IMAGING | Facility: CLINIC | Age: 66
End: 2021-05-27
Payer: COMMERCIAL

## 2021-05-27 DIAGNOSIS — Z00.8 ENCOUNTER FOR OTHER GENERAL EXAMINATION: ICD-10-CM

## 2021-05-27 DIAGNOSIS — Z98.89 OTHER SPECIFIED POSTPROCEDURAL STATES: Chronic | ICD-10-CM

## 2021-05-27 PROCEDURE — 93975 VASCULAR STUDY: CPT

## 2021-05-27 PROCEDURE — 93975 VASCULAR STUDY: CPT | Mod: 26

## 2021-05-27 PROCEDURE — 70450 CT HEAD/BRAIN W/O DYE: CPT | Mod: 26

## 2021-05-27 PROCEDURE — 70450 CT HEAD/BRAIN W/O DYE: CPT

## 2021-06-01 LAB
ALBUMIN SERPL ELPH-MCNC: 4.5 G/DL
ALDOSTERONE SERUM: 10.9 NG/DL
ALP BLD-CCNC: 73 U/L
ALT SERPL-CCNC: 36 U/L
ANION GAP SERPL CALC-SCNC: 15 MMOL/L
AST SERPL-CCNC: 23 U/L
BILIRUB SERPL-MCNC: 0.6 MG/DL
BUN SERPL-MCNC: 12 MG/DL
CALCIUM SERPL-MCNC: 10.1 MG/DL
CHLORIDE SERPL-SCNC: 96 MMOL/L
CHOLEST SERPL-MCNC: 161 MG/DL
CO2 SERPL-SCNC: 25 MMOL/L
CORTIS SERPL-MCNC: 12.3 UG/DL
CREAT SERPL-MCNC: 0.72 MG/DL
ESTIMATED AVERAGE GLUCOSE: 160 MG/DL
GLUCOSE SERPL-MCNC: 168 MG/DL
HBA1C MFR BLD HPLC: 7.2 %
HDLC SERPL-MCNC: 65 MG/DL
LDLC SERPL CALC-MCNC: 81 MG/DL
MAGNESIUM SERPL-MCNC: 1.9 MG/DL
NONHDLC SERPL-MCNC: 96 MG/DL
POTASSIUM SERPL-SCNC: 4.2 MMOL/L
PROT SERPL-MCNC: 7.5 G/DL
SODIUM SERPL-SCNC: 137 MMOL/L
TRIGL SERPL-MCNC: 77 MG/DL
TSH SERPL-ACNC: 1.43 UIU/ML

## 2021-06-03 LAB — RENIN ACTIVITY, PLASMA: <0.167 NG/ML/HR

## 2021-06-07 ENCOUNTER — APPOINTMENT (OUTPATIENT)
Dept: CARDIOLOGY | Facility: CLINIC | Age: 66
End: 2021-06-07
Payer: COMMERCIAL

## 2021-06-07 VITALS
RESPIRATION RATE: 16 BRPM | OXYGEN SATURATION: 99 % | DIASTOLIC BLOOD PRESSURE: 76 MMHG | WEIGHT: 140 LBS | HEIGHT: 60 IN | HEART RATE: 67 BPM | BODY MASS INDEX: 27.48 KG/M2 | TEMPERATURE: 97.9 F | SYSTOLIC BLOOD PRESSURE: 180 MMHG

## 2021-06-07 DIAGNOSIS — I10 ESSENTIAL (PRIMARY) HYPERTENSION: ICD-10-CM

## 2021-06-07 DIAGNOSIS — R60.0 LOCALIZED EDEMA: ICD-10-CM

## 2021-06-07 DIAGNOSIS — L81.9 DISORDER OF PIGMENTATION, UNSPECIFIED: ICD-10-CM

## 2021-06-07 LAB
5OH-INDOLEACETATE 24H UR-MRATE: 2.3 MG/24 H
5OH-INDOLEACETATE UR-MCNC: 1.08 G/24 H
DOPAMINE UR-MCNC: 56 UG/L
DOPAMINE, UR, 24HR: 162 UG/24 HR
EPINEPH UR-MCNC: 2 UG/L
EPINEPHRINE, U, 24HR: 6 UG/24 HR
NOREPINEPH UR-MCNC: 17 UG/L
NOREPINEPHRINE,U,24H: 49 UG/24 HR
SPECIMEN VOL 24H UR: 2900 ML

## 2021-06-07 PROCEDURE — 99072 ADDL SUPL MATRL&STAF TM PHE: CPT

## 2021-06-07 PROCEDURE — 99214 OFFICE O/P EST MOD 30 MIN: CPT

## 2021-06-07 RX ORDER — NEBIVOLOL HYDROCHLORIDE 10 MG/1
10 TABLET ORAL DAILY
Qty: 90 | Refills: 1 | Status: ACTIVE | COMMUNITY
Start: 2021-04-29 | End: 1900-01-01

## 2021-06-07 RX ORDER — TELMISARTAN AND HYDROCHLOROTHIAZIDE 80; 25 MG/1; MG/1
80-25 TABLET ORAL DAILY
Qty: 90 | Refills: 1 | Status: ACTIVE | COMMUNITY
Start: 2021-02-05 | End: 1900-01-01

## 2021-06-07 RX ORDER — NIFEDIPINE 30 MG/1
30 TABLET, EXTENDED RELEASE ORAL DAILY
Qty: 90 | Refills: 1 | Status: ACTIVE | COMMUNITY
Start: 2021-06-07 | End: 1900-01-01

## 2021-06-07 NOTE — ASSESSMENT
[FreeTextEntry1] : Nuclear stress test performed November 1, 2018 is a pharmacologic study which was tolerated well. Blood pressure response was normal. EKG showed horizontal ST depressions with infusion. However at one point and the nuclear images which demonstrate no evidence of ischemia or infarct with a normal ejection fraction of 74%. Incidentally noted was borderline TID of no clear significance given the normal perfusion.\par \par Echocardiogram November 11, 2019 demonstrated left ventricle normal size and function with ejection fraction of 65%. Mild concentric LVH noted. Trace mitral and moderate tricuspid regurgitation noted. Borderline elevated pulmonary pressures noted.\par \par Renal arterial Doppler May 27, 2021 showed no evidence of renal artery stenosis.\par \par CT of the head May 27, 2021 showed no evidence of stroke, hemorrhage or mass-effect.\par \par 66-year-old female with a past medical history of HTN, DM who presents today for followup.  Patient is not having any further symptoms at this time but her blood pressure remains significantly elevated.  At this time I believe we should try nifedipine again as the seem to really control her blood pressure very well prior to discontinuation.  I would not make any other changes to her medication but if her blood pressure dramatically improved we may be able to pull back and stop at least Bystolic.  Secondary work-up for hypertension is generally unremarkable with the exception of a low renal and.  However given her normal aldosterone this does not appear to be significant.  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  No evidence of heart failure.

## 2021-06-07 NOTE — DISCUSSION/SUMMARY
[FreeTextEntry1] : 1. Start nifedipine ER 30 mg daily.\par 2. Continue other current cardiac meds in doses as noted above for hypertension.\par 3. Monitor BP at home, keep a log and bring to f/u.  \par 4. No additional cardiac testing at this time.\par 5. Follow up with primary doctor for treatment of diabetes.\par 6. Patient is encouraged to exercise at least 30 minutes a day everyday of the week.\par 7. Patient encouraged to work on diet to lose weight.\par 8. Follow up here in 1 month.

## 2021-06-07 NOTE — HISTORY OF PRESENT ILLNESS
[FreeTextEntry1] : Patient presents back today feeling physically well with no specific complaints.  She is not really having any headaches at this time.  Blood pressures have continued to be elevated and most recently in the 140s to 160s over 70s to 80s.  She is tolerating the medication without a problem.  She did have all of her testing done.  Patient denies chest pain, shortness of breath, palpitations, orthopnea, presyncope, syncope.

## 2021-06-15 LAB
ALDOST 24H UR-MCNC: <7.25 UG/24 HR
SPIRONOLACTONE UR-MCNC: <2.5 UG/L

## 2021-07-01 ENCOUNTER — APPOINTMENT (OUTPATIENT)
Dept: CARDIOLOGY | Facility: CLINIC | Age: 66
End: 2021-07-01

## 2021-09-28 ENCOUNTER — APPOINTMENT (OUTPATIENT)
Dept: NEUROSURGERY | Facility: CLINIC | Age: 66
End: 2021-09-28
Payer: COMMERCIAL

## 2021-09-28 VITALS
HEIGHT: 60 IN | SYSTOLIC BLOOD PRESSURE: 140 MMHG | DIASTOLIC BLOOD PRESSURE: 60 MMHG | WEIGHT: 142 LBS | HEART RATE: 61 BPM | TEMPERATURE: 97.1 F | BODY MASS INDEX: 27.88 KG/M2

## 2021-09-28 DIAGNOSIS — Z98.1 ARTHRODESIS STATUS: ICD-10-CM

## 2021-09-28 DIAGNOSIS — M48.8X9 OTHER SPECIFIED SPONDYLOPATHIES, SITE UNSPECIFIED: ICD-10-CM

## 2021-09-28 PROCEDURE — 99213 OFFICE O/P EST LOW 20 MIN: CPT

## 2021-09-28 RX ORDER — ROSUVASTATIN CALCIUM 20 MG/1
20 TABLET, FILM COATED ORAL
Qty: 90 | Refills: 0 | Status: ACTIVE | COMMUNITY
Start: 2021-08-16

## 2021-09-28 RX ORDER — CELECOXIB 200 MG/1
200 CAPSULE ORAL TWICE DAILY
Qty: 30 | Refills: 1 | Status: ACTIVE | COMMUNITY
Start: 2021-09-28 | End: 1900-01-01

## 2021-09-28 RX ORDER — HYDRALAZINE HYDROCHLORIDE 50 MG/1
50 TABLET ORAL
Qty: 180 | Refills: 0 | Status: ACTIVE | COMMUNITY
Start: 2021-08-16

## 2021-09-28 NOTE — RESULTS/DATA
[FreeTextEntry1] : Stand-up MRI was reviewed of the cervical spine.  X-rays of the cervical spine were done in the office on [facility].  A CAT scan from several years ago was also reviewed.  She has a C3 to T1 posterior fusion with laminectomy.  Spinal cord appears to be well decompressed.  Fusion mass appears to be robust in the hardware and instrumentation looks like it is in good shape.

## 2021-09-28 NOTE — HISTORY OF PRESENT ILLNESS
[de-identified] : \par Pleasant 66-year-old accompanied by her daughter for evaluation of her cervical spine.  She is a patient of mine having had a posterior cervical decompression and fusion C3-T1.  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  Over the years she is done extraordinarily well however in the recent past developed worsening neck pain and some intermittent numbness and tingling which prompted new MRI to be performed at stand-up.  She has no symptoms of myelopathy overall and in speaking with her and her daughter she is actually doing very well but wanted her neck evaluated because of these new complaints.  She is doing some pain management techniques and is wondering about trigger points as a treatment for her neck pain.  She is really on no medication whatsoever for pain.

## 2021-09-28 NOTE — PLAN
[FreeTextEntry1] : \par This is a patient who is status post cervical decompression with fusion with persistent neck pain.  Recommended local treatment such as topical medications and simple anti-inflammatory.  She may consider trigger point or other pain management techniques.  I have advised against epidural steroid injections.  This point she can see me periodically as needed if new imaging becomes available for my review.

## 2022-11-16 ENCOUNTER — APPOINTMENT (OUTPATIENT)
Dept: RADIOLOGY | Facility: CLINIC | Age: 67
End: 2022-11-16

## 2022-11-16 ENCOUNTER — APPOINTMENT (OUTPATIENT)
Dept: MAMMOGRAPHY | Facility: CLINIC | Age: 67
End: 2022-11-16

## 2022-11-16 ENCOUNTER — OUTPATIENT (OUTPATIENT)
Dept: OUTPATIENT SERVICES | Facility: HOSPITAL | Age: 67
LOS: 1 days | End: 2022-11-16
Payer: COMMERCIAL

## 2022-11-16 DIAGNOSIS — Z98.89 OTHER SPECIFIED POSTPROCEDURAL STATES: Chronic | ICD-10-CM

## 2022-11-16 DIAGNOSIS — Z00.8 ENCOUNTER FOR OTHER GENERAL EXAMINATION: ICD-10-CM

## 2022-11-16 PROCEDURE — 77063 BREAST TOMOSYNTHESIS BI: CPT | Mod: 26

## 2022-11-16 PROCEDURE — 77067 SCR MAMMO BI INCL CAD: CPT | Mod: 26

## 2022-11-16 PROCEDURE — 77067 SCR MAMMO BI INCL CAD: CPT

## 2022-11-16 PROCEDURE — 77080 DXA BONE DENSITY AXIAL: CPT | Mod: 26

## 2022-11-16 PROCEDURE — 77063 BREAST TOMOSYNTHESIS BI: CPT

## 2022-11-16 PROCEDURE — 77080 DXA BONE DENSITY AXIAL: CPT

## 2024-11-18 ENCOUNTER — APPOINTMENT (OUTPATIENT)
Dept: MAMMOGRAPHY | Facility: CLINIC | Age: 69
End: 2024-11-18

## 2024-11-18 ENCOUNTER — OUTPATIENT (OUTPATIENT)
Dept: OUTPATIENT SERVICES | Facility: HOSPITAL | Age: 69
LOS: 1 days | End: 2024-11-18
Payer: MEDICARE

## 2024-11-18 ENCOUNTER — APPOINTMENT (OUTPATIENT)
Dept: RADIOLOGY | Facility: CLINIC | Age: 69
End: 2024-11-18

## 2024-11-18 DIAGNOSIS — Z12.31 ENCOUNTER FOR SCREENING MAMMOGRAM FOR MALIGNANT NEOPLASM OF BREAST: ICD-10-CM

## 2024-11-18 DIAGNOSIS — Z98.89 OTHER SPECIFIED POSTPROCEDURAL STATES: Chronic | ICD-10-CM

## 2024-11-18 PROCEDURE — 77063 BREAST TOMOSYNTHESIS BI: CPT | Mod: 26

## 2024-11-18 PROCEDURE — 77067 SCR MAMMO BI INCL CAD: CPT

## 2024-11-18 PROCEDURE — 77080 DXA BONE DENSITY AXIAL: CPT | Mod: 26

## 2024-11-18 PROCEDURE — 77067 SCR MAMMO BI INCL CAD: CPT | Mod: 26

## 2024-11-18 PROCEDURE — 77080 DXA BONE DENSITY AXIAL: CPT

## 2024-11-18 PROCEDURE — 77063 BREAST TOMOSYNTHESIS BI: CPT
